# Patient Record
Sex: FEMALE | Race: WHITE
[De-identification: names, ages, dates, MRNs, and addresses within clinical notes are randomized per-mention and may not be internally consistent; named-entity substitution may affect disease eponyms.]

---

## 2017-08-31 ENCOUNTER — HOSPITAL ENCOUNTER (EMERGENCY)
Dept: HOSPITAL 83 - ED | Age: 32
Discharge: HOME | End: 2017-08-31
Payer: COMMERCIAL

## 2017-08-31 VITALS — WEIGHT: 120 LBS | HEIGHT: 60.98 IN | BODY MASS INDEX: 22.66 KG/M2

## 2017-08-31 DIAGNOSIS — F41.9: Primary | ICD-10-CM

## 2017-08-31 DIAGNOSIS — Z88.6: ICD-10-CM

## 2017-08-31 DIAGNOSIS — Z88.8: ICD-10-CM

## 2017-08-31 DIAGNOSIS — F17.200: ICD-10-CM

## 2017-09-16 ENCOUNTER — HOSPITAL ENCOUNTER (EMERGENCY)
Dept: HOSPITAL 83 - ED | Age: 32
Discharge: HOME | End: 2017-09-16
Payer: COMMERCIAL

## 2017-09-16 VITALS — HEIGHT: 55 IN | WEIGHT: 87 LBS

## 2017-09-16 DIAGNOSIS — Z88.1: ICD-10-CM

## 2017-09-16 DIAGNOSIS — S09.90XA: Primary | ICD-10-CM
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
## 2017-09-16 DIAGNOSIS — Z88.8: ICD-10-CM

## 2017-09-16 DIAGNOSIS — F17.200: ICD-10-CM
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
## 2019-05-14 ENCOUNTER — HOSPITAL ENCOUNTER (OUTPATIENT)
Age: 34
Setting detail: OBSERVATION
Discharge: HOME | DRG: 560 | End: 2019-05-15
Attending: OBSTETRICS & GYNECOLOGY | Admitting: OBSTETRICS & GYNECOLOGY
Payer: MEDICAID

## 2019-05-14 LAB
BASOPHILS ABSOLUTE: 0.05 E9/L (ref 0–0.2)
BASOPHILS RELATIVE PERCENT: 0.4 % (ref 0–2)
EOSINOPHILS ABSOLUTE: 0.15 E9/L (ref 0.05–0.5)
EOSINOPHILS RELATIVE PERCENT: 1.2 % (ref 0–6)
HCT VFR BLD CALC: 37.9 % (ref 34–48)
HEMOGLOBIN: 12.6 G/DL (ref 11.5–15.5)
IMMATURE GRANULOCYTES #: 0.05 E9/L
IMMATURE GRANULOCYTES %: 0.4 % (ref 0–5)
LYMPHOCYTES ABSOLUTE: 1.98 E9/L (ref 1.5–4)
LYMPHOCYTES RELATIVE PERCENT: 15.6 % (ref 20–42)
MCH RBC QN AUTO: 28.8 PG (ref 26–35)
MCHC RBC AUTO-ENTMCNC: 33.2 % (ref 32–34.5)
MCV RBC AUTO: 86.7 FL (ref 80–99.9)
MONOCYTES ABSOLUTE: 0.88 E9/L (ref 0.1–0.95)
MONOCYTES RELATIVE PERCENT: 6.9 % (ref 2–12)
NEUTROPHILS ABSOLUTE: 9.62 E9/L (ref 1.8–7.3)
NEUTROPHILS RELATIVE PERCENT: 75.5 % (ref 43–80)
PDW BLD-RTO: 13 FL (ref 11.5–15)
PLATELET # BLD: 302 E9/L (ref 130–450)
PMV BLD AUTO: 11 FL (ref 7–12)
RBC # BLD: 4.37 E12/L (ref 3.5–5.5)
WBC # BLD: 12.7 E9/L (ref 4.5–11.5)

## 2019-05-14 PROCEDURE — 96360 HYDRATION IV INFUSION INIT: CPT

## 2019-05-14 PROCEDURE — 87491 CHLMYD TRACH DNA AMP PROBE: CPT

## 2019-05-14 PROCEDURE — 96361 HYDRATE IV INFUSION ADD-ON: CPT

## 2019-05-14 PROCEDURE — G0378 HOSPITAL OBSERVATION PER HR: HCPCS

## 2019-05-14 PROCEDURE — 87591 N.GONORRHOEAE DNA AMP PROB: CPT

## 2019-05-14 PROCEDURE — 85025 COMPLETE CBC W/AUTO DIFF WBC: CPT

## 2019-05-14 PROCEDURE — 86850 RBC ANTIBODY SCREEN: CPT

## 2019-05-14 PROCEDURE — 36415 COLL VENOUS BLD VENIPUNCTURE: CPT

## 2019-05-14 PROCEDURE — 86901 BLOOD TYPING SEROLOGIC RH(D): CPT

## 2019-05-14 PROCEDURE — 86900 BLOOD TYPING SEROLOGIC ABO: CPT

## 2019-05-14 PROCEDURE — 2580000003 HC RX 258: Performed by: OBSTETRICS & GYNECOLOGY

## 2019-05-14 RX ORDER — BETAMETHASONE SODIUM PHOSPHATE AND BETAMETHASONE ACETATE 3; 3 MG/ML; MG/ML
12 INJECTION, SUSPENSION INTRA-ARTICULAR; INTRALESIONAL; INTRAMUSCULAR; SOFT TISSUE DAILY
Status: DISCONTINUED | OUTPATIENT
Start: 2019-05-15 | End: 2019-05-15 | Stop reason: HOSPADM

## 2019-05-14 RX ORDER — PENICILLIN G 3000000 [IU]/50ML
3 INJECTION, SOLUTION INTRAVENOUS EVERY 4 HOURS
Status: DISCONTINUED | OUTPATIENT
Start: 2019-05-15 | End: 2019-05-15 | Stop reason: HOSPADM

## 2019-05-14 RX ORDER — DOCUSATE SODIUM 100 MG/1
100 CAPSULE, LIQUID FILLED ORAL 2 TIMES DAILY
Status: DISCONTINUED | OUTPATIENT
Start: 2019-05-14 | End: 2019-05-15 | Stop reason: HOSPADM

## 2019-05-14 RX ORDER — ACETAMINOPHEN 325 MG/1
650 TABLET ORAL EVERY 4 HOURS PRN
Status: DISCONTINUED | OUTPATIENT
Start: 2019-05-14 | End: 2019-05-15 | Stop reason: HOSPADM

## 2019-05-14 RX ORDER — SODIUM CHLORIDE 0.9 % (FLUSH) 0.9 %
10 SYRINGE (ML) INJECTION PRN
Status: DISCONTINUED | OUTPATIENT
Start: 2019-05-14 | End: 2019-05-15 | Stop reason: HOSPADM

## 2019-05-14 RX ORDER — BUSPIRONE HYDROCHLORIDE 10 MG/1
10 TABLET ORAL DAILY
Status: ON HOLD | COMMUNITY
End: 2019-05-20 | Stop reason: HOSPADM

## 2019-05-14 RX ORDER — SODIUM CHLORIDE 0.9 % (FLUSH) 0.9 %
10 SYRINGE (ML) INJECTION EVERY 12 HOURS SCHEDULED
Status: DISCONTINUED | OUTPATIENT
Start: 2019-05-14 | End: 2019-05-15 | Stop reason: HOSPADM

## 2019-05-14 RX ORDER — BETAMETHASONE SODIUM PHOSPHATE AND BETAMETHASONE ACETATE 3; 3 MG/ML; MG/ML
INJECTION, SUSPENSION INTRA-ARTICULAR; INTRALESIONAL; INTRAMUSCULAR; SOFT TISSUE
Status: COMPLETED
Start: 2019-05-14 | End: 2019-05-15

## 2019-05-14 RX ORDER — ONDANSETRON 2 MG/ML
4 INJECTION INTRAMUSCULAR; INTRAVENOUS EVERY 6 HOURS PRN
Status: DISCONTINUED | OUTPATIENT
Start: 2019-05-14 | End: 2019-05-15 | Stop reason: HOSPADM

## 2019-05-14 RX ORDER — SODIUM CHLORIDE, SODIUM LACTATE, POTASSIUM CHLORIDE, CALCIUM CHLORIDE 600; 310; 30; 20 MG/100ML; MG/100ML; MG/100ML; MG/100ML
INJECTION, SOLUTION INTRAVENOUS CONTINUOUS
Status: DISCONTINUED | OUTPATIENT
Start: 2019-05-14 | End: 2019-05-15 | Stop reason: HOSPADM

## 2019-05-14 RX ADMIN — SODIUM CHLORIDE, POTASSIUM CHLORIDE, SODIUM LACTATE AND CALCIUM CHLORIDE: 600; 310; 30; 20 INJECTION, SOLUTION INTRAVENOUS at 22:55

## 2019-05-15 VITALS
RESPIRATION RATE: 16 BRPM | BODY MASS INDEX: 21.9 KG/M2 | DIASTOLIC BLOOD PRESSURE: 55 MMHG | TEMPERATURE: 98.1 F | HEIGHT: 61 IN | WEIGHT: 116 LBS | SYSTOLIC BLOOD PRESSURE: 90 MMHG | HEART RATE: 77 BPM

## 2019-05-15 VITALS
HEART RATE: 72 BPM | TEMPERATURE: 97.9 F | RESPIRATION RATE: 16 BRPM | SYSTOLIC BLOOD PRESSURE: 117 MMHG | DIASTOLIC BLOOD PRESSURE: 74 MMHG

## 2019-05-15 PROBLEM — O47.9 UTERINE CONTRACTIONS DURING PREGNANCY: Status: ACTIVE | Noted: 2019-05-15

## 2019-05-15 LAB
ABO/RH: NORMAL
ABO/RH: NORMAL
AMPHETAMINE SCREEN, URINE: NOT DETECTED
ANTIBODY SCREEN: NORMAL
ANTIBODY SCREEN: NORMAL
BACTERIA: NORMAL /HPF
BARBITURATE SCREEN URINE: NOT DETECTED
BENZODIAZEPINE SCREEN, URINE: NOT DETECTED
BILIRUBIN URINE: NEGATIVE
BLOOD, URINE: ABNORMAL
CANNABINOID SCREEN URINE: NOT DETECTED
CLARITY: CLEAR
COCAINE METABOLITE SCREEN URINE: NOT DETECTED
COLOR: YELLOW
GLUCOSE URINE: NEGATIVE MG/DL
HCT VFR BLD CALC: 35 % (ref 34–48)
HEMOGLOBIN: 11.5 G/DL (ref 11.5–15.5)
KETONES, URINE: >=80 MG/DL
LEUKOCYTE ESTERASE, URINE: NEGATIVE
MCH RBC QN AUTO: 28.2 PG (ref 26–35)
MCHC RBC AUTO-ENTMCNC: 32.9 % (ref 32–34.5)
MCV RBC AUTO: 85.8 FL (ref 80–99.9)
METHADONE SCREEN, URINE: NOT DETECTED
NITRITE, URINE: NEGATIVE
OPIATE SCREEN URINE: NOT DETECTED
PDW BLD-RTO: 12.6 FL (ref 11.5–15)
PH UA: 7 (ref 5–9)
PHENCYCLIDINE SCREEN URINE: NOT DETECTED
PLATELET # BLD: 290 E9/L (ref 130–450)
PMV BLD AUTO: 11 FL (ref 7–12)
PROPOXYPHENE SCREEN: NOT DETECTED
PROTEIN UA: NEGATIVE MG/DL
RBC # BLD: 4.08 E12/L (ref 3.5–5.5)
RBC UA: NORMAL /HPF (ref 0–2)
SPECIFIC GRAVITY UA: 1.01 (ref 1–1.03)
UROBILINOGEN, URINE: 0.2 E.U./DL
WBC # BLD: 12.3 E9/L (ref 4.5–11.5)
WBC UA: NORMAL /HPF (ref 0–5)

## 2019-05-15 PROCEDURE — 96365 THER/PROPH/DIAG IV INF INIT: CPT

## 2019-05-15 PROCEDURE — 85027 COMPLETE CBC AUTOMATED: CPT

## 2019-05-15 PROCEDURE — 36415 COLL VENOUS BLD VENIPUNCTURE: CPT

## 2019-05-15 PROCEDURE — 99214 OFFICE O/P EST MOD 30 MIN: CPT | Performed by: MIDWIFE

## 2019-05-15 PROCEDURE — 6360000002 HC RX W HCPCS

## 2019-05-15 PROCEDURE — 81001 URINALYSIS AUTO W/SCOPE: CPT

## 2019-05-15 PROCEDURE — 80307 DRUG TEST PRSMV CHEM ANLYZR: CPT

## 2019-05-15 PROCEDURE — 86900 BLOOD TYPING SEROLOGIC ABO: CPT

## 2019-05-15 PROCEDURE — 96376 TX/PRO/DX INJ SAME DRUG ADON: CPT

## 2019-05-15 PROCEDURE — 2580000003 HC RX 258: Performed by: MIDWIFE

## 2019-05-15 PROCEDURE — 96372 THER/PROPH/DIAG INJ SC/IM: CPT

## 2019-05-15 PROCEDURE — 86901 BLOOD TYPING SEROLOGIC RH(D): CPT

## 2019-05-15 PROCEDURE — G0378 HOSPITAL OBSERVATION PER HR: HCPCS

## 2019-05-15 PROCEDURE — 6360000002 HC RX W HCPCS: Performed by: MIDWIFE

## 2019-05-15 PROCEDURE — 86850 RBC ANTIBODY SCREEN: CPT

## 2019-05-15 RX ORDER — SODIUM CHLORIDE, SODIUM LACTATE, POTASSIUM CHLORIDE, CALCIUM CHLORIDE 600; 310; 30; 20 MG/100ML; MG/100ML; MG/100ML; MG/100ML
INJECTION, SOLUTION INTRAVENOUS CONTINUOUS
Status: DISCONTINUED | OUTPATIENT
Start: 2019-05-15 | End: 2019-05-15 | Stop reason: HOSPADM

## 2019-05-15 RX ORDER — BETAMETHASONE SODIUM PHOSPHATE AND BETAMETHASONE ACETATE 3; 3 MG/ML; MG/ML
INJECTION, SUSPENSION INTRA-ARTICULAR; INTRALESIONAL; INTRAMUSCULAR; SOFT TISSUE
Status: DISCONTINUED
Start: 2019-05-15 | End: 2019-05-15 | Stop reason: WASHOUT

## 2019-05-15 RX ORDER — BUSPIRONE HYDROCHLORIDE 10 MG/1
10 TABLET ORAL DAILY
COMMUNITY

## 2019-05-15 RX ORDER — BETAMETHASONE SODIUM PHOSPHATE AND BETAMETHASONE ACETATE 3; 3 MG/ML; MG/ML
12 INJECTION, SUSPENSION INTRA-ARTICULAR; INTRALESIONAL; INTRAMUSCULAR; SOFT TISSUE ONCE
Status: DISCONTINUED | OUTPATIENT
Start: 2019-05-16 | End: 2019-05-15 | Stop reason: HOSPADM

## 2019-05-15 RX ORDER — PENICILLIN G 3000000 [IU]/50ML
3 INJECTION, SOLUTION INTRAVENOUS EVERY 4 HOURS
Status: DISCONTINUED | OUTPATIENT
Start: 2019-05-15 | End: 2019-05-15 | Stop reason: HOSPADM

## 2019-05-15 RX ADMIN — BETAMETHASONE SODIUM PHOSPHATE AND BETAMETHASONE ACETATE 12 MG: 3; 3 INJECTION, SUSPENSION INTRA-ARTICULAR; INTRALESIONAL; INTRAMUSCULAR at 00:06

## 2019-05-15 RX ADMIN — PENICILLIN G 3 MILLION UNITS: 3000000 INJECTION, SOLUTION INTRAVENOUS at 07:33

## 2019-05-15 RX ADMIN — DEXTROSE MONOHYDRATE 5 MILLION UNITS: 50 INJECTION, SOLUTION INTRAVENOUS at 03:29

## 2019-05-15 RX ADMIN — SODIUM CHLORIDE, POTASSIUM CHLORIDE, SODIUM LACTATE AND CALCIUM CHLORIDE: 600; 310; 30; 20 INJECTION, SOLUTION INTRAVENOUS at 03:00

## 2019-05-15 RX ADMIN — BETAMETHASONE SODIUM PHOSPHATE AND BETAMETHASONE ACETATE 12 MG: 3; 3 INJECTION, SUSPENSION INTRA-ARTICULAR; INTRALESIONAL; INTRAMUSCULAR; SOFT TISSUE at 00:06

## 2019-05-15 NOTE — PROGRESS NOTES
Pt present to unit complaining of ctx and red tinged mucus when wiping. Pt was transferred from [facility] yesterday around 2130 and signed out AMA around [facility]. Pt denies lof. Reports +fm.  EFM applied

## 2019-05-15 NOTE — PROGRESS NOTES
Ante-Partum Note         S:   Asked to evaluate patient by Dr. Madeleine Feliciano. FHT's reviewed. Pt continues dougie q 4-5', perceived as mild-mod. O: BP (!) 90/55   Pulse 77   Temp 98.1 °F (36.7 °C) (Oral)   Resp 16   Ht 5' 1\" (1.549 m)   Wt 116 lb (52.6 kg)   BMI 21.92 kg/m²               PEL:     Cx 2-3cm/conical cervix, 70-75% effaced, vtx, ballotable, -2 to -3. Cervix is low. IMP:  1. IUP @ 34 3/7 weeks            2.  Threatened PTL  Patient Active Problem List   Diagnosis    Uterine contractions during pregnancy          Plan: 1. D/W Dr. Madeleine Feliciano           2. Continued observation vs. Terbutaline injection discussed with patient. Dr Madeleine Feliciano willing to see patient in his office, but I would prefer her contractions to space before allowing discharge due to risk of PTL. 3.   Pt and partner discussing these options. Pt wants to deliver as she has other children at home. Risks of prematurity discussed with patient. Celestone x 1 was given last pm.     Addendum  Pt chooses to leave AMA despite risks to her child which were reviewed with patient. Going to Dr. Cris Boyle office following leaving offered to patient.

## 2019-05-15 NOTE — PROGRESS NOTES
Dr. Aydee Barr discussed the importance of staying with pt. Pt still wanting to leave AMA and states they are going directly to Dr. Norma Rodriguez office. Discharge instructions were given and pt verbally stated they understand instructions. Paper signed by pt to leave AMA.

## 2019-05-15 NOTE — PROGRESS NOTES
Patient took self off monitor to go outside to smoke. Patient educated per house physician regarding leaving the unit along with education provided by RN.  Patient did not have an Iv line

## 2019-05-15 NOTE — PROGRESS NOTES
Pt signed AMA papers and left the unit ambulatory with all personal belongings. Pt refused to sign educational discharge instructions.

## 2019-05-15 NOTE — PROGRESS NOTES
RN at bedside. Educated pt on leaving the unit per house officer. Pt requesting her IV be removed so she can leave. Re-educated pt on plan of care and the importance of staying in the hospital to be monitored for  labor. Pt continues to state she is leaving.

## 2019-05-15 NOTE — H&P
ppd, denies drug or alcohol use  Family History:   History reviewed. No pertinent family history. Medications Prior to Admission:  Medications Prior to Admission: busPIRone (BUSPAR) 10 MG tablet, Take 10 mg by mouth daily  PROGESTERONE IM, Inject into the muscle once a week Indications: mondays  LORazepam (ATIVAN) 0.5 MG tablet, Take 0.5 mg by mouth as needed for Anxiety Instructed to take with sip water am of procedure if needed  Allergies:  Prednisone and Prozac [fluoxetine hcl]        PHYSICAL EXAM:    VITALS:  BP (!) 100/58   Pulse 68   Temp 98.4 °F (36.9 °C) (Oral)   Resp 16   Ht 5' 1\" (1.549 m)   Wt 116 lb (52.6 kg)   BMI 21.92 kg/m²       General appearance:  awake, alert, cooperative, no apparent distress, and appears stated age  Neurologic:  Awake, alert, oriented to name, place and time. gait is normal.  Lungs:  No increased work of breathing, good air exchange, clear to auscultation bilaterally, no crackles or wheezing  Heart:  Normal apical impulse, regular rate and rhythm  Abdomen:  Gravid, nontender  Fetal heart rate:  Baseline Heart Rate 120, accelerations:  present  long term variability:  moderate  decelerations:  absent  Pelvis:  External Genitalia: General appearance; normal, Hair distribution; normal, Lesions absent  Vagina: Pelvic support normal  Uterus:  Palpable contractions  Cervix:    DILATION:  3 cm  EFFACEMENT:   70  STATION:  -3 cm  CONSISTENCY:  soft  POSITION:  posterior    PRESENTATION: cephalic      Contraction frequency:  2-6 minutes  Membranes:  Intact      ASSESSMENT AND PLAN:  Dr. Anthony Gomez notified patient returned  Continue with treatment plan  IVF LR  Urinalysis  Urine Drug Screen  CBC, Type and Screen  GBS and cultures sent today  PCN for unknown GBS  Consult Debbie Ramos in the morning  Second dose of Celestone 24 hrs after first dose    Nafisa Oven CNM

## 2019-05-15 NOTE — PROGRESS NOTES
Pt is requesting to leave AMA to go to Dr. Mercy Guzmán office. Notified house officer of pt's request. Pt is refusing to stay. Dr. Landa Sample explained risks of leaving with pt.

## 2019-05-16 LAB
CHLAMYDIA TRACHOMATIS AMPLIFIED DET: NORMAL
N GONORRHOEAE AMPLIFIED DET: NORMAL

## 2019-05-18 ENCOUNTER — HOSPITAL ENCOUNTER (INPATIENT)
Age: 34
LOS: 2 days | Discharge: HOME | DRG: 560 | End: 2019-05-20
Attending: OBSTETRICS & GYNECOLOGY | Admitting: OBSTETRICS & GYNECOLOGY
Payer: MEDICAID

## 2019-05-18 PROBLEM — O42.919 PRETERM PREMATURE RUPTURE OF MEMBRANES (PPROM) WITH UNKNOWN ONSET OF LABOR: Status: ACTIVE | Noted: 2019-05-18

## 2019-05-18 LAB
ABO/RH: NORMAL
AMPHETAMINE SCREEN, URINE: NOT DETECTED
ANTIBODY SCREEN: NORMAL
BARBITURATE SCREEN URINE: NOT DETECTED
BENZODIAZEPINE SCREEN, URINE: NOT DETECTED
CANNABINOID SCREEN URINE: NOT DETECTED
COCAINE METABOLITE SCREEN URINE: NOT DETECTED
HCT VFR BLD CALC: 34.1 % (ref 34–48)
HEMOGLOBIN: 11.2 G/DL (ref 11.5–15.5)
MCH RBC QN AUTO: 28.5 PG (ref 26–35)
MCHC RBC AUTO-ENTMCNC: 32.8 % (ref 32–34.5)
MCV RBC AUTO: 86.8 FL (ref 80–99.9)
METHADONE SCREEN, URINE: NOT DETECTED
OPIATE SCREEN URINE: NOT DETECTED
PDW BLD-RTO: 12.4 FL (ref 11.5–15)
PHENCYCLIDINE SCREEN URINE: NOT DETECTED
PLATELET # BLD: 270 E9/L (ref 130–450)
PMV BLD AUTO: 10.9 FL (ref 7–12)
PROPOXYPHENE SCREEN: NOT DETECTED
RAPID HIV 1&2: NORMAL
RBC # BLD: 3.93 E12/L (ref 3.5–5.5)
WBC # BLD: 10.7 E9/L (ref 4.5–11.5)

## 2019-05-18 PROCEDURE — 80307 DRUG TEST PRSMV CHEM ANLYZR: CPT

## 2019-05-18 PROCEDURE — 86762 RUBELLA ANTIBODY: CPT

## 2019-05-18 PROCEDURE — 1220000000 HC SEMI PRIVATE OB R&B

## 2019-05-18 PROCEDURE — 86701 HIV-1ANTIBODY: CPT

## 2019-05-18 PROCEDURE — 6360000002 HC RX W HCPCS

## 2019-05-18 PROCEDURE — 2580000003 HC RX 258: Performed by: OBSTETRICS & GYNECOLOGY

## 2019-05-18 PROCEDURE — 86900 BLOOD TYPING SEROLOGIC ABO: CPT

## 2019-05-18 PROCEDURE — 87340 HEPATITIS B SURFACE AG IA: CPT

## 2019-05-18 PROCEDURE — 36415 COLL VENOUS BLD VENIPUNCTURE: CPT

## 2019-05-18 PROCEDURE — 6370000000 HC RX 637 (ALT 250 FOR IP): Performed by: OBSTETRICS & GYNECOLOGY

## 2019-05-18 PROCEDURE — 59409 OBSTETRICAL CARE: CPT | Performed by: OBSTETRICS & GYNECOLOGY

## 2019-05-18 PROCEDURE — 88307 TISSUE EXAM BY PATHOLOGIST: CPT

## 2019-05-18 PROCEDURE — 7200000001 HC VAGINAL DELIVERY

## 2019-05-18 PROCEDURE — 6360000002 HC RX W HCPCS: Performed by: OBSTETRICS & GYNECOLOGY

## 2019-05-18 PROCEDURE — 86592 SYPHILIS TEST NON-TREP QUAL: CPT

## 2019-05-18 PROCEDURE — 85027 COMPLETE CBC AUTOMATED: CPT

## 2019-05-18 PROCEDURE — 86850 RBC ANTIBODY SCREEN: CPT

## 2019-05-18 PROCEDURE — 86901 BLOOD TYPING SEROLOGIC RH(D): CPT

## 2019-05-18 RX ORDER — ACETAMINOPHEN 325 MG/1
650 TABLET ORAL EVERY 4 HOURS PRN
Status: DISCONTINUED | OUTPATIENT
Start: 2019-05-18 | End: 2019-05-20 | Stop reason: HOSPADM

## 2019-05-18 RX ORDER — SODIUM CHLORIDE, SODIUM LACTATE, POTASSIUM CHLORIDE, CALCIUM CHLORIDE 600; 310; 30; 20 MG/100ML; MG/100ML; MG/100ML; MG/100ML
INJECTION, SOLUTION INTRAVENOUS CONTINUOUS
Status: DISCONTINUED | OUTPATIENT
Start: 2019-05-18 | End: 2019-05-20 | Stop reason: HOSPADM

## 2019-05-18 RX ORDER — PENICILLIN G POTASSIUM 5000000 [IU]/1
INJECTION, POWDER, FOR SOLUTION INTRAMUSCULAR; INTRAVENOUS
Status: DISPENSED
Start: 2019-05-18 | End: 2019-05-18

## 2019-05-18 RX ORDER — IBUPROFEN 600 MG/1
600 TABLET ORAL EVERY 6 HOURS PRN
Status: DISCONTINUED | OUTPATIENT
Start: 2019-05-18 | End: 2019-05-20 | Stop reason: HOSPADM

## 2019-05-18 RX ORDER — ACETAMINOPHEN 325 MG/1
650 TABLET ORAL EVERY 4 HOURS PRN
Status: DISCONTINUED | OUTPATIENT
Start: 2019-05-18 | End: 2019-05-18 | Stop reason: HOSPADM

## 2019-05-18 RX ORDER — PENICILLIN G 3000000 [IU]/50ML
3 INJECTION, SOLUTION INTRAVENOUS EVERY 4 HOURS
Status: DISCONTINUED | OUTPATIENT
Start: 2019-05-18 | End: 2019-05-18 | Stop reason: HOSPADM

## 2019-05-18 RX ORDER — DOCUSATE SODIUM 100 MG/1
100 CAPSULE, LIQUID FILLED ORAL 2 TIMES DAILY
Status: DISCONTINUED | OUTPATIENT
Start: 2019-05-18 | End: 2019-05-20 | Stop reason: HOSPADM

## 2019-05-18 RX ORDER — SODIUM CHLORIDE 0.9 % (FLUSH) 0.9 %
10 SYRINGE (ML) INJECTION EVERY 12 HOURS SCHEDULED
Status: DISCONTINUED | OUTPATIENT
Start: 2019-05-18 | End: 2019-05-20 | Stop reason: HOSPADM

## 2019-05-18 RX ORDER — SODIUM CHLORIDE 0.9 % (FLUSH) 0.9 %
10 SYRINGE (ML) INJECTION PRN
Status: DISCONTINUED | OUTPATIENT
Start: 2019-05-18 | End: 2019-05-18 | Stop reason: HOSPADM

## 2019-05-18 RX ORDER — SODIUM CHLORIDE 0.9 % (FLUSH) 0.9 %
10 SYRINGE (ML) INJECTION PRN
Status: DISCONTINUED | OUTPATIENT
Start: 2019-05-18 | End: 2019-05-20 | Stop reason: HOSPADM

## 2019-05-18 RX ORDER — DIPHENHYDRAMINE HCL 25 MG
25 TABLET ORAL NIGHTLY PRN
Status: DISCONTINUED | OUTPATIENT
Start: 2019-05-18 | End: 2019-05-20 | Stop reason: HOSPADM

## 2019-05-18 RX ORDER — ACETAMINOPHEN 650 MG
TABLET, EXTENDED RELEASE ORAL
Status: COMPLETED
Start: 2019-05-18 | End: 2019-05-18

## 2019-05-18 RX ORDER — ACETAMINOPHEN 650 MG
TABLET, EXTENDED RELEASE ORAL PRN
Status: DISCONTINUED | OUTPATIENT
Start: 2019-05-18 | End: 2019-05-20 | Stop reason: HOSPADM

## 2019-05-18 RX ORDER — LIDOCAINE HYDROCHLORIDE 10 MG/ML
INJECTION, SOLUTION INFILTRATION; PERINEURAL
Status: DISPENSED
Start: 2019-05-18 | End: 2019-05-19

## 2019-05-18 RX ORDER — DOCUSATE SODIUM 100 MG/1
100 CAPSULE, LIQUID FILLED ORAL 2 TIMES DAILY
Status: DISCONTINUED | OUTPATIENT
Start: 2019-05-18 | End: 2019-05-18 | Stop reason: HOSPADM

## 2019-05-18 RX ORDER — ONDANSETRON 2 MG/ML
4 INJECTION INTRAMUSCULAR; INTRAVENOUS EVERY 6 HOURS PRN
Status: DISCONTINUED | OUTPATIENT
Start: 2019-05-18 | End: 2019-05-18 | Stop reason: HOSPADM

## 2019-05-18 RX ORDER — LANOLIN 100 %
OINTMENT (GRAM) TOPICAL PRN
Status: DISCONTINUED | OUTPATIENT
Start: 2019-05-18 | End: 2019-05-20 | Stop reason: HOSPADM

## 2019-05-18 RX ADMIN — Medication 1 MILLI-UNITS/MIN: at 13:47

## 2019-05-18 RX ADMIN — Medication 10 ML: at 22:22

## 2019-05-18 RX ADMIN — ACETAMINOPHEN 650 MG: 325 TABLET ORAL at 19:17

## 2019-05-18 RX ADMIN — Medication: at 17:46

## 2019-05-18 RX ADMIN — DEXTROSE MONOHYDRATE 5 MILLION UNITS: 50 INJECTION, SOLUTION INTRAVENOUS at 09:44

## 2019-05-18 RX ADMIN — SODIUM CHLORIDE, POTASSIUM CHLORIDE, SODIUM LACTATE AND CALCIUM CHLORIDE: 600; 310; 30; 20 INJECTION, SOLUTION INTRAVENOUS at 09:45

## 2019-05-18 RX ADMIN — BUTORPHANOL TARTRATE 1 MG: 2 INJECTION, SOLUTION INTRAMUSCULAR; INTRAVENOUS at 16:01

## 2019-05-18 RX ADMIN — PENICILLIN G 3 MILLION UNITS: 3000000 INJECTION, SOLUTION INTRAVENOUS at 13:42

## 2019-05-18 RX ADMIN — DIPHENHYDRAMINE HCL 25 MG: 25 TABLET ORAL at 22:21

## 2019-05-18 RX ADMIN — IBUPROFEN 600 MG: 600 TABLET ORAL at 18:22

## 2019-05-18 ASSESSMENT — PAIN SCALES - GENERAL
PAINLEVEL_OUTOF10: 10
PAINLEVEL_OUTOF10: 6
PAINLEVEL_OUTOF10: 8

## 2019-05-18 ASSESSMENT — PAIN DESCRIPTION - DESCRIPTORS: DESCRIPTORS: SQUEEZING;SHARP

## 2019-05-18 NOTE — PROGRESS NOTES
EDC 2019 34 6/7 weeks Patient ambulatory to unit c spouse for c/o ROM clear fluid and continuing to leak since then. Fluid observed running down pt leg. UA obtained, EFM explained and applied, assessment completed. Bloody show observed, amnisure positive. Pt perceives good fetal movement, audible on EFM. Pt reports feeling  contractions intermittently rating 5/10 .

## 2019-05-18 NOTE — PROGRESS NOTES
Called Dr Lisandro Orona to inform that patient is making rapid change. Discussed SVE and FHR tracing.

## 2019-05-18 NOTE — L&D DELIVERY NOTE
Vaginal Delivery Note      Patient complete and pushing and had spontaneous delivery of viable female with spontaneous respiration and cry. NICU present for delivery. Cord clamping delayed. Cord blood obtained. Placenta delivered spontaneously and intact. No lacerations. Fundus firm. Lochia minimal. Patient stable.

## 2019-05-18 NOTE — PROGRESS NOTES
Dr. Jackeline Chacon ([place]) notified via phone and given report on pt arrival, c/o, amnisure positive, fhr and ctx pattern, as well as pt stating she received celestone 5/15/19 and 5/16/19 for being \"3 cm and 75% effaced in [place]\". States he will see her shortly.

## 2019-05-18 NOTE — H&P
Department of Obstetrics and Gynecology  Attending Obstetrics History and Physical        CHIEF COMPLAINT:  SROM    HISTORY OF PRESENT ILLNESS:    The patient is a 29 y.o. female [id number], No LMP recorded. Patient is pregnant. ,  at 34w6d presents c/o SROM clear fluid today at 0600. She feels her contractions but they are not painful. Pt here on 2019 with  labor and discharged. She received celestonex2. OB History        5    Para   3    Term   3       0    AB   1    Living   3       SAB   1    TAB   0    Ectopic   0    Molar   0    Multiple        Live Births   3                Estimated Due Date: Estimated Date of Delivery: 19    PRENATAL CARE:  uncomplicated    Complicated by:   Patient Active Problem List   Diagnosis Code    Active  labor, fetus 3 O63.11X1     labor third trimester with  delivery third trimester O60.14X0    Uterine contractions during pregnancy O62.2     premature rupture of membranes (PPROM) with unknown onset of labor O42.919       PAST OB HISTORY  OB History        5    Para   3    Term   3       0    AB   1    Living   3       SAB   1    TAB   0    Ectopic   0    Molar   0    Multiple        Live Births   3                Past Medical History:        Diagnosis Date    Anemia     Dental caries 1-15-16    for extractions    Mental disorder      Past Surgical History:        Procedure Laterality Date    TOOTH EXTRACTION  1/15/2016    x 7    WISDOM TOOTH EXTRACTION      WISDOM TOOTH EXTRACTION       Social History:    TOBACCO:   reports that she has been smoking cigarettes. She has been smoking about 1.00 pack per day. She has never used smokeless tobacco.  ETOH:   reports that she does not drink alcohol. DRUGS:   reports that she does not use drugs. Family History:   History reviewed. No pertinent family history.   Medications Prior to Admission:  Medications Prior to Admission: busPIRone (BUSPAR) 10 MG tablet, Take 10 mg by mouth daily  PROGESTERONE IM, Inject into the muscle once a week Indications: mondays  busPIRone (BUSPAR) 10 MG tablet, Take 10 mg by mouth daily  LORazepam (ATIVAN) 0.5 MG tablet, Take 0.5 mg by mouth as needed for Anxiety Instructed to take with sip water am of procedure if needed  Allergies:  Prednisone; Prozac [fluoxetine hcl]; and Prozac [fluoxetine hcl]  Labs:  Lab Results   Component Value Date    ABORH B POS 05/15/2019      No results found for: HAV, HEPAIGM, HEPBIGM, HEPBCAB, HBEAG, HEPCAB   No results found for: HIV1X2   No results found for: RUBELABIGG   Lab Results   Component Value Date    WBC 12.3 (H) 05/15/2019    HGB 11.5 05/15/2019    HCT 35.0 05/15/2019    MCV 85.8 05/15/2019     05/15/2019      Lab Results   Component Value Date    CTAMP  05/14/2019     Negative for C. Trachomatis rRNA  Results should be interpreted in conjunction with other  laboratory and clinical data available to the clinician. Lab Results   Component Value Date    NGAMP  05/14/2019     Negative for N. gonorrhoeae rRNA  Results should be interpreted in conjunction with other  laboratory and clinical data available to the clinician. No results found for: LABRPR  No results found for: GBSCX  Lab Results   Component Value Date    LABANTI NEG 05/15/2019     No results found for: GLUCOSE  No results found for: HBSAGI  Review of Systems:   Ears, nose, mouth, throat, and face: negative  Respiratory: negative  Cardiovascular: negative  Gastrointestinal: negative  Genitourinary:negative  Integument/breast: negative  Hematologic/lymphatic: negative  Musculoskeletal:negative  Neurological: negative  Behavioral/Psych: negative  Endocrine: negative  Allergic/Immunologic: negative  Psychosocial: negative    PHYSICAL EXAM:    General appearance:  awake, alert, cooperative, no apparent distress, and appears stated age  Neurologic:  Awake, alert, oriented to name, place and time.   Cranial nerves II-XII are grossly intact.     Lungs:  clear to auscultation bilaterally  Heart:  regular rate and rhythm  Abdomen:   soft, non-distended, non-tender, no masses palpated, gravid  Fetal heart rate:  Baseline Heart Rate 145, accelerations:  present, decels:absent  Pelvis:  External Genitalia: no lesions  Cervix:  DILATION:  4 cm  EFFACEMENT:   50%  STATION:  -3  CONSISTENCY:  soft    Contraction frequency:  q5 minutes  Membranes:  ruptured  Presentation: cephalic  /52   Pulse 81   Temp 98.8 °F (37.1 °C) (Oral)   Resp 18   Ht 5' 1\" (1.549 m)   Wt 116 lb (52.6 kg)   BMI 21.92 kg/m²     GBS  unknown- culture taken 5/14 and not resulted        ASSESSMENT AND PLAN:  Admit  fhr Cat I  PCN for unknown GBS  NICU for delivery        Electronically signed by Debbi Bojorquez MD on 5/18/2019 at 9:22 AM

## 2019-05-19 PROCEDURE — 6370000000 HC RX 637 (ALT 250 FOR IP): Performed by: OBSTETRICS & GYNECOLOGY

## 2019-05-19 PROCEDURE — 1220000000 HC SEMI PRIVATE OB R&B

## 2019-05-19 RX ORDER — ALBUTEROL SULFATE 2.5 MG/3ML
2.5 SOLUTION RESPIRATORY (INHALATION) EVERY 6 HOURS PRN
Status: DISCONTINUED | OUTPATIENT
Start: 2019-05-19 | End: 2019-05-20 | Stop reason: HOSPADM

## 2019-05-19 RX ADMIN — IBUPROFEN 600 MG: 600 TABLET ORAL at 01:35

## 2019-05-19 RX ADMIN — ACETAMINOPHEN 650 MG: 325 TABLET ORAL at 07:52

## 2019-05-19 RX ADMIN — IBUPROFEN 600 MG: 600 TABLET ORAL at 12:07

## 2019-05-19 RX ADMIN — ACETAMINOPHEN 650 MG: 325 TABLET ORAL at 23:06

## 2019-05-19 RX ADMIN — IBUPROFEN 600 MG: 600 TABLET ORAL at 18:37

## 2019-05-19 RX ADMIN — DIPHENHYDRAMINE HCL 25 MG: 25 TABLET ORAL at 23:06

## 2019-05-19 ASSESSMENT — PAIN DESCRIPTION - FREQUENCY
FREQUENCY: INTERMITTENT

## 2019-05-19 ASSESSMENT — PAIN DESCRIPTION - DESCRIPTORS
DESCRIPTORS: CRAMPING
DESCRIPTORS: CRAMPING;DISCOMFORT
DESCRIPTORS: CRAMPING;DISCOMFORT

## 2019-05-19 ASSESSMENT — PAIN DESCRIPTION - LOCATION
LOCATION: ABDOMEN

## 2019-05-19 ASSESSMENT — PAIN DESCRIPTION - PROGRESSION
CLINICAL_PROGRESSION: GRADUALLY WORSENING

## 2019-05-19 ASSESSMENT — PAIN SCALES - GENERAL
PAINLEVEL_OUTOF10: 3
PAINLEVEL_OUTOF10: 2
PAINLEVEL_OUTOF10: 0
PAINLEVEL_OUTOF10: 2
PAINLEVEL_OUTOF10: 3
PAINLEVEL_OUTOF10: 3
PAINLEVEL_OUTOF10: 1

## 2019-05-19 ASSESSMENT — PAIN DESCRIPTION - PAIN TYPE
TYPE: ACUTE PAIN

## 2019-05-19 ASSESSMENT — PAIN DESCRIPTION - ORIENTATION
ORIENTATION: LOWER;MID

## 2019-05-19 ASSESSMENT — PAIN DESCRIPTION - ONSET
ONSET: GRADUAL

## 2019-05-19 ASSESSMENT — PAIN - FUNCTIONAL ASSESSMENT
PAIN_FUNCTIONAL_ASSESSMENT: ACTIVITIES ARE NOT PREVENTED

## 2019-05-19 NOTE — PROGRESS NOTES
Department of Obstetrics and Gynecology  Labor and Delivery  Attending Post Partum Progress Note    Postpartum Day # 1 s/p     SUBJECTIVE:  Pt c/o some chest tightness and wheezing. Pumping for baby. Lochia minimal. Tolerating regular diet. Ambulating without difficulty. OBJECTIVE:      Vitals:  /75   Pulse 101   Temp 98.1 °F (36.7 °C) (Oral)   Resp 16   Ht 5' 1\" (1.549 m)   Wt 116 lb (52.6 kg)   Breastfeeding?  Unknown   BMI 21.92 kg/m²     GENERAL- Patient alert, in no acute distress  HEAD- normocephalic, atraumatic  LUNGS-bilateral wheezing present  ABDOMEN- soft, non-tender, non-distended  FUNDUS- firm, non-tender  EXTREMITITES- non-tender, no edema      DATA:    Blood Type:  No results found for: ABOINT  RH:  No results found for: ANATITER, C3, C4, RF  CBC:    Lab Results   Component Value Date    WBC 10.7 2019    RBC 3.93 2019    HGB 11.2 2019    HCT 34.1 2019    MCV 86.8 2019    RDW 12.4 2019     2019       ASSESSMENT & PLAN:  Postpartum day #1 S/P , doing well  Continue routine post-partum care  Will give albuterol tx prn

## 2019-05-19 NOTE — PROGRESS NOTES
Dr. Lakesha Thorne notified of pt not feeling well. Inspiratory wheezes, cough, and recent treatment for bronchitis. States he will come and see her.

## 2019-05-19 NOTE — PROGRESS NOTES
Assessment as charted. Pt states she feels she is \"coming down with something\". States she has a cough that is ocaisionally productive. Pt admits to smoking and also states she was treated for bronchitis a week ago. States she finished her z pack. Inspiratory wheezes heard through out her lungs. Will notify the house doctor.

## 2019-05-20 VITALS
BODY MASS INDEX: 21.9 KG/M2 | HEART RATE: 78 BPM | TEMPERATURE: 98.2 F | WEIGHT: 116 LBS | DIASTOLIC BLOOD PRESSURE: 60 MMHG | SYSTOLIC BLOOD PRESSURE: 115 MMHG | RESPIRATION RATE: 18 BRPM | HEIGHT: 61 IN

## 2019-05-20 LAB
HEPATITIS B SURFACE ANTIGEN INTERPRETATION: NORMAL
RPR: NORMAL

## 2019-05-20 NOTE — LACTATION NOTE
Mom back from nicu, states she hasn't pumped yet due to being on Benadryl qd for anxiety. States she usually waits to see if milk comes in to see if she will supply breast milk. Normal milk production and importance of frequent pumping sessions explained in order to stimulate supply. Benadryl is an L2 medication. Mom has an ebp at home. Doesn't appear interested in information at this time. [employer] info as well as lactation office number given if follow-up needed. Encouraged to call if any assistance needed.

## 2019-05-20 NOTE — PROGRESS NOTES
Discharge instructions given and verbally understood. Discharge home with  in satisfactory condition.

## 2019-05-20 NOTE — PLAN OF CARE
Problem: Discharge Planning:  Goal: Discharged to appropriate level of care  Description  Discharged to appropriate level of care  Outcome: Ongoing     Problem: Constipation:  Goal: Bowel elimination is within specified parameters  Description  Bowel elimination is within specified parameters  Outcome: Ongoing     Problem: Mood - Altered:  Goal: Mood stable  Description  Mood stable  Outcome: Ongoing
Completed  Goal: Labor progession, second stage, within specified pattern  Description  Labor progession, second stage, within specified pattern  2019 by Tucker Odom RN  Outcome: Completed  Goal: Uterine contractions within specified parameters  Description  Uterine contractions within specified parameters  2019 by Tucker Odom RN  Outcome: Completed     Problem:  Screening:  Goal: Ability to make informed decisions regarding treatment has improved  Description  Ability to make informed decisions regarding treatment has improved  2019 by Tucker Odom RN  Outcome: Completed     Problem: Pain - Acute:  Goal: Pain level will decrease  Description  Pain level will decrease  2019 by Tucker Odom RN  Outcome: Completed  Goal: Able to cope with pain  Description  Able to cope with pain  2019 by Tucker Odom RN  Outcome: Completed     Problem: Tissue Perfusion - Uteroplacental, Altered:  Goal: Absence of abnormal fetal heart rate pattern  Description  Absence of abnormal fetal heart rate pattern  2019 by Tucker Odom RN  Outcome: Completed     Problem: Urinary Retention:  Goal: Experiences of bladder distention will decrease  Description  Experiences of bladder distention will decrease  2019 by Tucker Odom RN  Outcome: Completed  Goal: Urinary elimination within specified parameters  Description  Urinary elimination within specified parameters  2019 by Tucker Odom RN  Outcome: Completed     Problem: Infection - Risk of, Puerperal Infection:  Goal: Will show no infection signs and symptoms  Description  Will show no infection signs and symptoms  2019 by Tucker Odom RN  Outcome: Completed

## 2019-05-20 NOTE — PROGRESS NOTES
Progress Note    SUBJECTIVE:  Pt comfortable  + void   +flatus  decreased vaginal bleeding  - Breastfeeding    OBJECTIVE:    VITALS:  /60   Pulse 78   Temp 98.2 °F (36.8 °C) (Oral)   Resp 18   Ht 5' 1\" (1.549 m)   Wt 116 lb (52.6 kg)   Breastfeeding?  Unknown   BMI 21.92 kg/m²   Physical Exam  Uterus firm,nt  EXT nt    DATA:  Hemoglobin/Hematocrit:    Lab Results   Component Value Date    HGB 11.2 2019    HCT 34.1 2019       ASSESSMENT AND PLAN:    34yo  @ 34+ weeks s/p   Routine post partum care  Discharge home today       Rivera Lina  [email or web address]@

## 2019-05-21 LAB — RUBELLA ANTIBODY IGG: NORMAL

## 2019-06-11 NOTE — DISCHARGE SUMMARY
Obstetric Discharge Summary    Admitting Diagnosis  IUP 34+ weeks  OB History      Para Term  AB Living    3 2 2     2    SAB TAB Ectopic Molar Multiple Live Births              2          Reasons for Admission on 2018 12:21 AM  Hyperemesis [R11.10]  Hyperemesis [R11.10]  Hyperemesis [R11.10]  No comment available  Onset of Labor    Prenatal Procedures      Intrapartum Procedures                 Spontaneous Vaginal Delivery:        Postpartum Procedures  None    Postpartum/Operative Complications        Data  This patient has no babies on file. Discharge With Mother  Complications: No    Discharge Diagnosis       Discharge Information  Discharge Medication List as of 2018  9:55 AM      CONTINUE these medications which have CHANGED    Details   ondansetron (ZOFRAN ODT) 4 MG disintegrating tablet Take 1 tablet by mouth every 8 hours as needed for Nausea or Vomiting, Disp-30 tablet, R-0Normal         CONTINUE these medications which have NOT CHANGED    Details   multivitamin (ANIMAL SHAPES) with C & FA CHEW chewable tablet Take 1 tablet by mouth dailyHistorical Med         STOP taking these medications       Iaradm-NbRle-CsFec-Meth-FA-DHA (PRENATE MINI) 18-0.6-0.4-350 MG CAPS Comments:   Reason for Stopping:             Condition at Discharge:    Stable    No discharge procedures on file. Discharge to: Home  Follow up in 6 weeks at clinic.

## 2019-12-30 ENCOUNTER — HOSPITAL ENCOUNTER (OUTPATIENT)
Dept: HOSPITAL 83 - US | Age: 34
Discharge: HOME | End: 2019-12-30
Attending: NURSE PRACTITIONER
Payer: MEDICAID

## 2019-12-30 DIAGNOSIS — N92.6: Primary | ICD-10-CM

## 2020-01-09 ENCOUNTER — HOSPITAL ENCOUNTER (EMERGENCY)
Dept: HOSPITAL 83 - ED | Age: 35
Discharge: LEFT BEFORE DISCHARGE | End: 2020-01-09
Payer: MEDICAID

## 2020-01-09 VITALS — HEIGHT: 60.98 IN | WEIGHT: 105 LBS | BODY MASS INDEX: 19.83 KG/M2

## 2020-01-09 DIAGNOSIS — Z53.21: ICD-10-CM

## 2020-01-09 DIAGNOSIS — R42: ICD-10-CM

## 2020-01-09 DIAGNOSIS — R53.83: Primary | ICD-10-CM

## 2020-01-09 DIAGNOSIS — R53.1: ICD-10-CM

## 2020-06-16 ENCOUNTER — HOSPITAL ENCOUNTER (OUTPATIENT)
Dept: HOSPITAL 83 - LAB | Age: 35
Discharge: HOME | End: 2020-06-16
Attending: FAMILY MEDICINE
Payer: MEDICAID

## 2020-06-16 DIAGNOSIS — R53.83: ICD-10-CM

## 2020-06-16 DIAGNOSIS — E55.9: Primary | ICD-10-CM

## 2020-06-16 DIAGNOSIS — R79.89: ICD-10-CM

## 2020-06-16 LAB
25(OH)D3 SERPL-MCNC: 26.6 NG/ML (ref 30–100)
ALBUMIN SERPL-MCNC: 3.8 GM/DL (ref 3.1–4.5)
ALP SERPL-CCNC: 93 U/L (ref 45–117)
ALT SERPL W P-5'-P-CCNC: 23 U/L (ref 12–78)
APPEARANCE UR: CLEAR
AST SERPL-CCNC: 15 IU/L (ref 3–35)
B-HCG SERPL-ACNC: < 1 MIU/ML (ref 1–3)
BACTERIA #/AREA URNS HPF: (no result) /[HPF]
BASOPHILS # BLD AUTO: 0.1 10*3/UL (ref 0–0.1)
BASOPHILS NFR BLD AUTO: 0.9 % (ref 0–1)
BILIRUB UR QL STRIP: NEGATIVE
BUN SERPL-MCNC: 6 MG/DL (ref 7–24)
CHLORIDE SERPL-SCNC: 108 MMOL/L (ref 98–107)
CHOLEST SERPL-MCNC: 164 MG/DL (ref ?–200)
COLOR UR: YELLOW
CREAT SERPL-MCNC: 0.68 MG/DL (ref 0.55–1.02)
EOSINOPHIL # BLD AUTO: 0.2 10*3/UL (ref 0–0.4)
EOSINOPHIL # BLD AUTO: 1.9 % (ref 1–4)
EPI CELLS #/AREA URNS HPF: (no result) /[HPF]
ERYTHROCYTE [DISTWIDTH] IN BLOOD BY AUTOMATED COUNT: 13.3 % (ref 0–14.5)
FERRITIN SERPL-MCNC: 28 NG/ML (ref 10–291)
GGT SERPL-CCNC: 21 U/L (ref 5–55)
GLUCOSE UR QL: NEGATIVE
HCT VFR BLD AUTO: 44.9 % (ref 37–47)
HDLC SERPL-MCNC: 50 MG/DL (ref 40–60)
HGB UR QL STRIP: NEGATIVE
IRON SERPL-MCNC: 145 UG/DL (ref 50–170)
KETONES UR QL STRIP: NEGATIVE
LDLC SERPL DIRECT ASSAY-MCNC: 92 MG/DL (ref 9–159)
LEUKOCYTE ESTERASE UR QL STRIP: (no result)
LYMPHOCYTES # BLD AUTO: 1.5 10*3/UL (ref 1.3–4.4)
LYMPHOCYTES NFR BLD AUTO: 18.9 % (ref 27–41)
MCH RBC QN AUTO: 30.2 PG (ref 27–31)
MCHC RBC AUTO-ENTMCNC: 33.9 G/DL (ref 33–37)
MCV RBC AUTO: 89.1 FL (ref 81–99)
MONOCYTES # BLD AUTO: 0.5 10*3/UL (ref 0.1–1)
MONOCYTES NFR BLD MANUAL: 5.9 % (ref 3–9)
NEUT #: 5.9 10*3/UL (ref 2.3–7.9)
NEUT %: 72.2 % (ref 47–73)
NITRITE UR QL STRIP: NEGATIVE
NRBC BLD QL AUTO: 0 % (ref 0–0)
PH UR STRIP: 8 [PH] (ref 5–9)
PLATELET # BLD AUTO: 321 10*3/UL (ref 130–400)
PMV BLD AUTO: 10.1 FL (ref 9.6–12.3)
POTASSIUM SERPL-SCNC: 3.6 MMOL/L (ref 3.5–5.1)
PROT SERPL-MCNC: 7.7 GM/DL (ref 6.4–8.2)
RBC # BLD AUTO: 5.04 10*6/UL (ref 4.1–5.1)
RBC #/AREA URNS HPF: (no result) RBC/HPF (ref 0–2)
RETICS/RBC NFR AUTO: 1.37 % (ref 0.5–2.5)
SODIUM SERPL-SCNC: 140 MMOL/L (ref 136–145)
SP GR UR: 1 (ref 1–1.03)
T3 SERPL-MCNC: 33 % (ref 31–39)
T4 SERPL-MCNC: 8.7 UG/DL (ref 4.8–13.9)
TIBC SERPL-MCNC: 357 UG/DL (ref 250–450)
TRIGL SERPL-MCNC: 110 MG/DL (ref ?–150)
TSH SERPL DL<=0.005 MIU/L-ACNC: 1.63 UIU/ML (ref 0.36–4.75)
UROBILINOGEN UR STRIP-MCNC: 0.2 E.U./DL (ref 0.2–1)
VITAMIN B12: 653 PG/ML (ref 247–911)
VLDLC SERPL CALC-MCNC: 22 MG/DL (ref 6–40)
WBC #/AREA URNS HPF: (no result) WBC/HPF (ref 0–5)
WBC NRBC COR # BLD AUTO: 8.1 10*3/UL (ref 4.8–10.8)

## 2020-06-17 LAB
CALCIUM 24H UR-MRATE: 19.1 NG/ML (ref 4.8–23.3)
FSH SERPL-ACNC: 4.4 MIU/ML
LH SERPL-ACNC: 6.3 MIU/ML
PROGEST SERPL-MCNC: 15.1 NG/ML

## 2020-06-18 LAB — IGF-I SERPL-MCNC: 169 NG/ML (ref 73–243)

## 2021-02-09 ENCOUNTER — HOSPITAL ENCOUNTER (OUTPATIENT)
Dept: HOSPITAL 83 - COVID19 | Age: 36
Discharge: HOME | End: 2021-02-09
Attending: FAMILY MEDICINE
Payer: COMMERCIAL

## 2021-02-09 DIAGNOSIS — U07.1: Primary | ICD-10-CM

## 2021-07-13 ENCOUNTER — HOSPITAL ENCOUNTER (OUTPATIENT)
Dept: HOSPITAL 83 - LAB | Age: 36
Discharge: HOME | End: 2021-07-13
Attending: FAMILY MEDICINE
Payer: COMMERCIAL

## 2021-07-13 DIAGNOSIS — E55.9: ICD-10-CM

## 2021-07-13 DIAGNOSIS — E78.5: Primary | ICD-10-CM

## 2021-07-13 DIAGNOSIS — R53.83: ICD-10-CM

## 2021-07-13 DIAGNOSIS — R79.89: ICD-10-CM

## 2021-07-13 LAB
25(OH)D3 SERPL-MCNC: 26.2 NG/ML (ref 30–100)
ALBUMIN SERPL-MCNC: 3.8 GM/DL (ref 3.1–4.5)
ALP SERPL-CCNC: 107 U/L (ref 45–117)
ALT SERPL W P-5'-P-CCNC: 59 U/L (ref 12–78)
AST SERPL-CCNC: 26 IU/L (ref 3–35)
BACTERIA #/AREA URNS HPF: (no result) /[HPF]
BASOPHILS # BLD AUTO: 0.1 10*3/UL (ref 0–0.1)
BASOPHILS NFR BLD AUTO: 0.6 % (ref 0–1)
BUN SERPL-MCNC: 5 MG/DL (ref 7–24)
CHLORIDE SERPL-SCNC: 109 MMOL/L (ref 98–107)
CHOLEST SERPL-MCNC: 145 MG/DL (ref ?–200)
CREAT SERPL-MCNC: 0.7 MG/DL (ref 0.55–1.02)
EOSINOPHIL # BLD AUTO: 0.2 10*3/UL (ref 0–0.4)
EOSINOPHIL # BLD AUTO: 2.2 % (ref 1–4)
EPI CELLS #/AREA URNS HPF: (no result) /[HPF]
ERYTHROCYTE [DISTWIDTH] IN BLOOD BY AUTOMATED COUNT: 13.3 % (ref 0–14.5)
FERRITIN SERPL-MCNC: 41.3 NG/ML (ref 10–291)
GGT SERPL-CCNC: 24 U/L (ref 5–55)
HCT VFR BLD AUTO: 51.3 % (ref 37–47)
IRON SERPL-MCNC: 64 UG/DL (ref 50–170)
LDLC SERPL DIRECT ASSAY-MCNC: 86 MG/DL (ref 9–159)
LEUKOCYTE ESTERASE UR QL STRIP: (no result)
LYMPHOCYTES # BLD AUTO: 1.6 10*3/UL (ref 1.3–4.4)
LYMPHOCYTES NFR BLD AUTO: 17.2 % (ref 27–41)
MCH RBC QN AUTO: 29.9 PG (ref 27–31)
MCHC RBC AUTO-ENTMCNC: 33.3 G/DL (ref 33–37)
MCV RBC AUTO: 89.7 FL (ref 81–99)
MONOCYTES # BLD AUTO: 0.5 10*3/UL (ref 0.1–1)
MONOCYTES NFR BLD MANUAL: 5.7 % (ref 3–9)
NEUT #: 6.9 10*3/UL (ref 2.3–7.9)
NEUT %: 74 % (ref 47–73)
NRBC BLD QL AUTO: 0 % (ref 0–0)
PH UR STRIP: 6 [PH] (ref 4.5–8)
PLATELET # BLD AUTO: 274 10*3/UL (ref 130–400)
PMV BLD AUTO: 10.2 FL (ref 9.6–12.3)
POTASSIUM SERPL-SCNC: 4 MMOL/L (ref 3.5–5.1)
PROT SERPL-MCNC: 7.5 GM/DL (ref 6.4–8.2)
RBC # BLD AUTO: 5.72 10*6/UL (ref 4.1–5.1)
RBC #/AREA URNS HPF: (no result) RBC/HPF (ref 0–2)
RETICS/RBC NFR AUTO: 1.32 % (ref 0.5–2.5)
SODIUM SERPL-SCNC: 138 MMOL/L (ref 136–145)
SP GR UR: <= 1.005 (ref 1–1.03)
T3 SERPL-MCNC: 34 % (ref 31–39)
T4 SERPL-MCNC: 10.4 UG/DL (ref 4.8–13.9)
TIBC SERPL-MCNC: 318 UG/DL (ref 250–450)
TRIGL SERPL-MCNC: 80 MG/DL (ref ?–150)
TSH SERPL DL<=0.005 MIU/L-ACNC: 1.24 UIU/ML (ref 0.36–4.75)
UROBILINOGEN UR STRIP-MCNC: 1 E.U./DL (ref 0–1)
VITAMIN B12: 716 PG/ML (ref 247–911)
WBC #/AREA URNS HPF: (no result) WBC/HPF (ref 0–5)
WBC NRBC COR # BLD AUTO: 9.4 10*3/UL (ref 4.8–10.8)
YEAST #/AREA URNS HPF: (no result) /[HPF]

## 2022-10-19 ENCOUNTER — HOSPITAL ENCOUNTER (OUTPATIENT)
Dept: HOSPITAL 83 - LAB | Age: 37
Discharge: HOME | End: 2022-10-19
Attending: FAMILY MEDICINE
Payer: COMMERCIAL

## 2022-10-19 DIAGNOSIS — E78.5: ICD-10-CM

## 2022-10-19 DIAGNOSIS — R79.89: ICD-10-CM

## 2022-10-19 DIAGNOSIS — E55.9: ICD-10-CM

## 2022-10-19 DIAGNOSIS — R53.83: Primary | ICD-10-CM

## 2022-10-19 LAB
25(OH)D3 SERPL-MCNC: 17.4 NG/ML (ref 30–100)
ALP SERPL-CCNC: 68 U/L (ref 45–117)
ALT SERPL W P-5'-P-CCNC: 35 U/L (ref 12–78)
AST SERPL-CCNC: 18 IU/L (ref 3–35)
B-HCG SERPL-ACNC: < 1 MIU/ML (ref 1–3)
BACTERIA #/AREA URNS HPF: (no result) /[HPF]
BASOPHILS # BLD AUTO: 0.1 10*3/UL (ref 0–0.1)
BASOPHILS NFR BLD AUTO: 0.9 % (ref 0–1)
BUN SERPL-MCNC: 7 MG/DL (ref 7–24)
CHLORIDE SERPL-SCNC: 110 MMOL/L (ref 98–107)
CHOLEST SERPL-MCNC: 135 MG/DL (ref ?–200)
CREAT SERPL-MCNC: 0.69 MG/DL (ref 0.55–1.02)
EOSINOPHIL # BLD AUTO: 0.1 10*3/UL (ref 0–0.4)
EOSINOPHIL # BLD AUTO: 1 % (ref 1–4)
EPI CELLS #/AREA URNS HPF: (no result) /[HPF]
ERYTHROCYTE [DISTWIDTH] IN BLOOD BY AUTOMATED COUNT: 13.2 % (ref 0–14.5)
FERRITIN SERPL-MCNC: 123.7 NG/ML (ref 10–291)
GGT SERPL-CCNC: 23 U/L (ref 5–55)
HCT VFR BLD AUTO: 51.6 % (ref 37–47)
IRON SERPL-MCNC: 75 UG/DL (ref 50–170)
LDLC SERPL DIRECT ASSAY-MCNC: 75 MG/DL (ref 9–159)
LYMPHOCYTES # BLD AUTO: 1.7 10*3/UL (ref 1.3–4.4)
LYMPHOCYTES NFR BLD AUTO: 21.3 % (ref 27–41)
MCH RBC QN AUTO: 30.5 PG (ref 27–31)
MCHC RBC AUTO-ENTMCNC: 34.5 G/DL (ref 33–37)
MCV RBC AUTO: 88.4 FL (ref 81–99)
MONOCYTES # BLD AUTO: 0.5 10*3/UL (ref 0.1–1)
MONOCYTES NFR BLD MANUAL: 5.9 % (ref 3–9)
MUCOUS THREADS URNS QL MICRO: (no result)
NEUT #: 5.5 10*3/UL (ref 2.3–7.9)
NEUT %: 70.8 % (ref 47–73)
NRBC BLD QL AUTO: 0 10*3/UL (ref 0–0)
PH UR STRIP: 5 [PH] (ref 4.5–8)
PLATELET # BLD AUTO: 273 10*3/UL (ref 130–400)
PMV BLD AUTO: 11 FL (ref 9.6–12.3)
POTASSIUM SERPL-SCNC: 3.5 MMOL/L (ref 3.5–5.1)
PROT SERPL-MCNC: 8.2 GM/DL (ref 6.4–8.2)
RBC # BLD AUTO: 5.84 10*6/UL (ref 4.1–5.1)
RETICS/RBC NFR AUTO: 1.03 % (ref 0.5–2.5)
SODIUM SERPL-SCNC: 142 MMOL/L (ref 136–145)
SP GR UR: 1.01 (ref 1–1.03)
T3 SERPL-MCNC: 35 % (ref 31–39)
T4 SERPL-MCNC: 13.2 UG/DL (ref 4.8–13.9)
TRIGL SERPL-MCNC: 77 MG/DL (ref ?–150)
TSH SERPL DL<=0.005 MIU/L-ACNC: 1.73 UIU/ML (ref 0.36–4.75)
UROBILINOGEN UR STRIP-MCNC: 0.2 E.U./DL (ref 0–1)
VITAMIN B12: 853 PG/ML (ref 247–911)
WBC #/AREA URNS HPF: (no result) WBC/HPF (ref 0–5)
WBC NRBC COR # BLD AUTO: 7.7 10*3/UL (ref 4.8–10.8)

## 2022-12-02 ENCOUNTER — HOSPITAL ENCOUNTER (OUTPATIENT)
Dept: HOSPITAL 83 - US | Age: 37
Discharge: HOME | End: 2022-12-02
Attending: INTERNAL MEDICINE
Payer: COMMERCIAL

## 2022-12-02 DIAGNOSIS — K80.20: Primary | ICD-10-CM

## 2022-12-02 DIAGNOSIS — D75.1: ICD-10-CM

## 2022-12-06 ENCOUNTER — HOSPITAL ENCOUNTER (EMERGENCY)
Dept: HOSPITAL 83 - ED | Age: 37
Discharge: LEFT BEFORE DISCHARGE | End: 2022-12-06
Payer: COMMERCIAL

## 2022-12-06 VITALS — WEIGHT: 104 LBS | HEIGHT: 55 IN

## 2022-12-06 DIAGNOSIS — Z53.21: Primary | ICD-10-CM

## 2024-03-12 ENCOUNTER — HOSPITAL ENCOUNTER (OUTPATIENT)
Dept: HOSPITAL 83 - LAB | Age: 39
Discharge: HOME | End: 2024-03-12
Attending: FAMILY MEDICINE
Payer: COMMERCIAL

## 2024-03-12 ENCOUNTER — HOSPITAL ENCOUNTER (OUTPATIENT)
Dept: HOSPITAL 83 - MRI | Age: 39
Discharge: HOME | End: 2024-03-12
Attending: FAMILY MEDICINE
Payer: COMMERCIAL

## 2024-03-12 DIAGNOSIS — E55.9: ICD-10-CM

## 2024-03-12 DIAGNOSIS — G40.89: Primary | ICD-10-CM

## 2024-03-12 DIAGNOSIS — R53.83: ICD-10-CM

## 2024-03-12 DIAGNOSIS — R79.89: ICD-10-CM

## 2024-03-12 DIAGNOSIS — E78.5: Primary | ICD-10-CM

## 2024-03-12 LAB
25(OH)D3 SERPL-MCNC: 15.2 NG/ML (ref 30–100)
ALP SERPL-CCNC: 78 U/L (ref 46–116)
ALT SERPL W P-5'-P-CCNC: 16 U/L (ref 5–49)
BASOPHILS # BLD AUTO: 0.1 10*3/UL (ref 0–0.1)
BASOPHILS NFR BLD AUTO: 0.9 % (ref 0–1)
BUN SERPL-MCNC: 6 MG/DL (ref 9–23)
CHLORIDE SERPL-SCNC: 109 MMOL/L (ref 98–107)
CHOLEST SERPL-MCNC: 149 MG/DL (ref ?–200)
EOSINOPHIL # BLD AUTO: 0.1 10*3/UL (ref 0–0.4)
EOSINOPHIL # BLD AUTO: 0.6 % (ref 1–4)
EPI CELLS #/AREA URNS HPF: (no result) /[HPF]
ERYTHROCYTE [DISTWIDTH] IN BLOOD BY AUTOMATED COUNT: 13.2 % (ref 0–14.5)
GGT SERPL-CCNC: 36 U/L (ref 0–73)
HCT VFR BLD AUTO: 51.4 % (ref 37–47)
LDLC SERPL DIRECT ASSAY-MCNC: 78 MG/DL (ref 9–159)
LYMPHOCYTES # BLD AUTO: 2 10*3/UL (ref 1.3–4.4)
LYMPHOCYTES NFR BLD AUTO: 21.7 % (ref 27–41)
MCH RBC QN AUTO: 30.9 PG (ref 27–31)
MCHC RBC AUTO-ENTMCNC: 33.9 G/DL (ref 33–37)
MCV RBC AUTO: 91.3 FL (ref 81–99)
MONOCYTES # BLD AUTO: 0.5 10*3/UL (ref 0.1–1)
MONOCYTES NFR BLD MANUAL: 5.4 % (ref 3–9)
NEUT #: 6.7 10*3/UL (ref 2.3–7.9)
NEUT %: 71.2 % (ref 47–73)
NRBC BLD QL AUTO: 0 10*3/UL (ref 0–0)
PH UR STRIP: 6 [PH] (ref 4.5–8)
PLATELET # BLD AUTO: 252 10*3/UL (ref 130–400)
PMV BLD AUTO: 10.6 FL (ref 9.6–12.3)
POTASSIUM SERPL-SCNC: 4 MMOL/L (ref 3.4–5.1)
PROT SERPL-MCNC: 7.7 GM/DL (ref 6–8)
RBC # BLD AUTO: 5.63 10*6/UL (ref 4.1–5.1)
RETICS/RBC NFR AUTO: 1.45 % (ref 0.5–2.5)
SP GR UR: <= 1.005 (ref 1–1.03)
T3 SERPL-MCNC: 31.1 % (ref 22.4–36.7)
T4 SERPL-MCNC: 9 UG/DL (ref 4.5–10.9)
TRIGL SERPL-MCNC: 53 MG/DL (ref ?–150)
UROBILINOGEN UR STRIP-MCNC: 0.2 E.U./DL (ref 0–1)
WBC #/AREA URNS HPF: (no result) WBC/HPF (ref 0–5)
WBC NRBC COR # BLD AUTO: 9.4 10*3/UL (ref 4.8–10.8)

## 2024-06-22 ENCOUNTER — HOSPITAL ENCOUNTER (EMERGENCY)
Dept: HOSPITAL 83 - ED | Age: 39
Discharge: HOME | End: 2024-06-22
Payer: COMMERCIAL

## 2024-06-22 DIAGNOSIS — Z88.8: ICD-10-CM

## 2024-06-22 DIAGNOSIS — F31.9: ICD-10-CM

## 2024-06-22 DIAGNOSIS — F41.9: Primary | ICD-10-CM

## 2024-09-04 ENCOUNTER — HOSPITAL ENCOUNTER (OUTPATIENT)
Dept: HOSPITAL 83 - RAD | Age: 39
Discharge: HOME | End: 2024-09-04
Attending: FAMILY MEDICINE
Payer: COMMERCIAL

## 2024-09-04 DIAGNOSIS — R07.81: ICD-10-CM

## 2024-09-04 DIAGNOSIS — M25.511: Primary | ICD-10-CM

## 2024-11-13 ENCOUNTER — HOSPITAL ENCOUNTER (OUTPATIENT)
Dept: HOSPITAL 83 - US | Age: 39
Discharge: HOME | End: 2024-11-13
Attending: NURSE PRACTITIONER
Payer: COMMERCIAL

## 2024-11-13 DIAGNOSIS — N88.8: Primary | ICD-10-CM

## 2024-11-13 DIAGNOSIS — Z72.51: ICD-10-CM

## 2024-11-13 DIAGNOSIS — N94.89: ICD-10-CM

## 2024-11-13 DIAGNOSIS — N95.1: ICD-10-CM

## 2024-12-11 ENCOUNTER — HOSPITAL ENCOUNTER (OUTPATIENT)
Dept: HOSPITAL 83 - LAB | Age: 39
Discharge: HOME | End: 2024-12-11
Attending: FAMILY MEDICINE
Payer: COMMERCIAL

## 2024-12-11 DIAGNOSIS — E55.9: ICD-10-CM

## 2024-12-11 DIAGNOSIS — R74.8: ICD-10-CM

## 2024-12-11 DIAGNOSIS — R53.83: ICD-10-CM

## 2024-12-11 DIAGNOSIS — R79.89: Primary | ICD-10-CM

## 2024-12-11 LAB
25(OH)D3 SERPL-MCNC: 19.9 NG/ML (ref 30–100)
ALP SERPL-CCNC: 77 U/L (ref 46–116)
ALT SERPL W P-5'-P-CCNC: 18 U/L (ref 5–49)
BASOPHILS # BLD AUTO: 0.1 10*3/UL (ref 0–0.1)
BASOPHILS NFR BLD AUTO: 1.1 % (ref 0–1)
BUN SERPL-MCNC: 6 MG/DL (ref 9–23)
CHLORIDE SERPL-SCNC: 105 MMOL/L (ref 98–107)
CHOLEST SERPL-MCNC: 166 MG/DL (ref ?–200)
EOSINOPHIL # BLD AUTO: 0.1 10*3/UL (ref 0–0.4)
EOSINOPHIL # BLD AUTO: 1.3 % (ref 1–4)
EPI CELLS #/AREA URNS HPF: (no result) /[HPF]
ERYTHROCYTE [DISTWIDTH] IN BLOOD BY AUTOMATED COUNT: 12.8 % (ref 0–14.5)
GGT SERPL-CCNC: 35 U/L (ref 0–73)
HCT VFR BLD AUTO: 46.9 % (ref 37–47)
HGB UR QL STRIP: (no result)
LDLC SERPL DIRECT ASSAY-MCNC: 98 MG/DL (ref 9–159)
MCH RBC QN AUTO: 31.6 PG (ref 27–31)
MCHC RBC AUTO-ENTMCNC: 34.1 G/DL (ref 33–37)
MCV RBC AUTO: 92.5 FL (ref 81–99)
MONOCYTES # BLD AUTO: 0.5 10*3/UL (ref 0.1–1)
MONOCYTES NFR BLD MANUAL: 6.8 % (ref 3–9)
NEUT #: 5 10*3/UL (ref 2.3–7.9)
NEUT %: 62.6 % (ref 47–73)
NRBC BLD QL AUTO: 0 10*3/UL (ref 0–0)
PH UR STRIP: 5 [PH] (ref 4.5–8)
PLATELET # BLD AUTO: 299 10*3/UL (ref 130–400)
PMV BLD AUTO: 9.9 FL (ref 9.6–12.3)
POTASSIUM SERPL-SCNC: 4 MMOL/L (ref 3.4–5.1)
PROT SERPL-MCNC: 7.2 GM/DL (ref 6–8)
RBC # BLD AUTO: 5.07 10*6/UL (ref 4.1–5.1)
RBC #/AREA URNS HPF: (no result) RBC/HPF (ref 0–2)
RETICS/RBC NFR AUTO: 0.94 % (ref 0.5–2.5)
SP GR UR: 1.02 (ref 1–1.03)
TRIGL SERPL-MCNC: 105 MG/DL (ref ?–150)
UROBILINOGEN UR STRIP-MCNC: 1 E.U./DL (ref 0–1)
WBC #/AREA URNS HPF: (no result) WBC/HPF (ref 0–5)
WBC NRBC COR # BLD AUTO: 8 10*3/UL (ref 4.8–10.8)

## 2024-12-17 ENCOUNTER — HOSPITAL ENCOUNTER (OUTPATIENT)
Dept: HOSPITAL 83 - LAB | Age: 39
Discharge: HOME | End: 2024-12-17
Attending: FAMILY MEDICINE
Payer: COMMERCIAL

## 2024-12-17 DIAGNOSIS — R10.2: ICD-10-CM

## 2024-12-17 DIAGNOSIS — R10.84: Primary | ICD-10-CM

## 2025-01-07 ENCOUNTER — HOSPITAL ENCOUNTER (OUTPATIENT)
Dept: HOSPITAL 83 - CT | Age: 40
Discharge: HOME | End: 2025-01-07
Attending: FAMILY MEDICINE
Payer: COMMERCIAL

## 2025-01-07 DIAGNOSIS — J98.11: ICD-10-CM

## 2025-01-07 DIAGNOSIS — R10.84: Primary | ICD-10-CM

## 2025-01-07 DIAGNOSIS — R10.2: ICD-10-CM

## 2025-03-19 ENCOUNTER — HOSPITAL ENCOUNTER (OUTPATIENT)
Dept: HOSPITAL 83 - RAD | Age: 40
Discharge: HOME | End: 2025-03-19
Attending: FAMILY MEDICINE
Payer: COMMERCIAL
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
## 2025-03-19 DIAGNOSIS — R06.02: ICD-10-CM

## 2025-03-19 DIAGNOSIS — S22.31XD: Primary | ICD-10-CM

## 2025-03-19 DIAGNOSIS — R07.81: ICD-10-CM
